# Patient Record
(demographics unavailable — no encounter records)

---

## 2025-02-24 NOTE — ASSESSMENT
[FreeTextEntry1] : Migraine headaches.  Currently using about 12 sumatriptan per month and he is pleased with the response He does not wish to try any other preventative medication Cautioned about overuse of sumatriptan  Follow-up in 1 year and by phone prior to that as needed.

## 2025-02-24 NOTE — PHYSICAL EXAM
[General Appearance - Alert] : alert [General Appearance - In No Acute Distress] : in no acute distress [General Appearance - Well-Appearing] : healthy appearing [Oriented To Time, Place, And Person] : oriented to person, place, and time [Impaired Insight] : insight and judgment were intact [Affect] : the affect was normal [Memory Recent] : recent memory was not impaired [Person] : oriented to person [Place] : oriented to place [Time] : oriented to time [Concentration Intact] : normal concentrating ability [Fluency] : fluency intact [Comprehension] : comprehension intact [Cranial Nerves Optic (II)] : visual acuity intact bilaterally,  visual fields full to confrontation, pupils equal round and reactive to light [Cranial Nerves Oculomotor (III)] : extraocular motion intact [Cranial Nerves Trigeminal (V)] : facial sensation intact symmetrically [Cranial Nerves Facial (VII)] : face symmetrical [Cranial Nerves Vestibulocochlear (VIII)] : hearing was intact bilaterally [Cranial Nerves Glossopharyngeal (IX)] : tongue and palate midline [Cranial Nerves Accessory (XI - Cranial And Spinal)] : head turning and shoulder shrug symmetric [Cranial Nerves Hypoglossal (XII)] : there was no tongue deviation with protrusion [Motor Tone] : muscle tone was normal in all four extremities [Motor Strength] : muscle strength was normal in all four extremities [No Muscle Atrophy] : normal bulk in all four extremities [Paresis Pronator Drift Right-Sided] : no pronator drift on the right [Paresis Pronator Drift Left-Sided] : no pronator drift on the left [Sensation Tactile Decrease] : light touch was intact [Sensation Pain / Temperature Decrease] : pain and temperature was intact [Romberg's Sign] : Romberg's sign was negtive [Abnormal Walk] : normal gait [Balance] : balance was intact [Past-pointing] : there was no past-pointing [Tremor] : no tremor present [Coordination - Dysmetria Impaired Finger-to-Nose Bilateral] : not present [Coordination - Dysmetria Impaired Heel-to-Shin Bilateral] : not present [2+] : Ankle jerk left 2+ [Plantar Reflex Right Only] : normal on the right [Plantar Reflex Left Only] : normal on the left [PERRL With Normal Accommodation] : pupils were equal in size, round, reactive to light, with normal accommodation [Extraocular Movements] : extraocular movements were intact [Optic Disc Abnormality] : the optic disc were normal in size and color [Full Visual Field] : full visual field

## 2025-02-24 NOTE — CONSULT LETTER
[Dear  ___] : Dear  [unfilled], [Consult Letter:] : I had the pleasure of evaluating your patient, [unfilled]. [Please see my note below.] : Please see my note below. [Consult Closing:] : Thank you very much for allowing me to participate in the care of this patient.  If you have any questions, please do not hesitate to contact me. [Sincerely,] : Sincerely, [FreeTextEntry3] : Shane Robles MD, PhD, DPN-N\par  [facility]\par  Neurology at [place]\par  Chief, Division of Neurology\par   [facility]\par

## 2025-02-24 NOTE — HISTORY OF PRESENT ILLNESS
[FreeTextEntry1] : I saw him initially 6/16/21 with the following history. History and examination carried out with . About 2 weeks ago he went to [facility]. He had a severe headache that lasted for 2 days. It was severe throbbing. There was some nausea and vomiting as well as photophobia. There was no visual disturbance. He had a CT scan of the head which is reported negative. They gave him medication headache resolved. He feels today that the headache started to come back. He had a similar headache 2 years ago and was actually seen here in the office at that time.  Medical history otherwise unremarkable.  I started him on sumatriptan 50 mg orally as needed for presumptive migraine headaches. He said is working well.  However, he relates that he is taking it 12 to 15 days out of the month, often more than once on the days that he uses it.  I tried him on propranolol 40 mg twice a day for prophylaxis He says it did not help Currently using about 12 sumatriptan per month He is pleased with the response he gets and does not wish to try any other preventative medications.